# Patient Record
(demographics unavailable — no encounter records)

---
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
## 2025-04-21 NOTE — ASSESSMENT
[FreeTextEntry1] : Ms. OCAMPO 's questions were answered to her satisfaction.  She  expressed her  understanding and willingness to comply with the above recommendations, and  will return to the office in 6 months.

## 2025-04-21 NOTE — PHYSICAL EXAM
[Restricted in physically strenuous activity but ambulatory and able to carry out work of a light or sedentary nature] : Status 1- Restricted in physically strenuous activity but ambulatory and able to carry out work of a light or sedentary nature, e.g., light house work, office work [Normal] : normal appearance, no rash, nodules, vesicles, ulcers, erythema [de-identified] : bilateral breast lumpectomy; scars well-healed

## 2025-04-21 NOTE — REASON FOR VISIT
[Follow-Up Visit] : a follow-up [Family Member] : family member [FreeTextEntry2] : right breast invasive lobular carcinoma

## 2025-07-07 NOTE — ASSESSMENT
[FreeTextEntry1] : The patient is a 79 year old female with Right breast DCIS, low grade x 2 lesions, ER/LA positive, Left breast IDP and radial scars x 3 lesions, now s/p R breast lumpectomy and L excisional biopsy 5/20/2022.  5/20/2022 R lumpectomy and L excisional biopsy  - L RA and LOQ: IDP, radial scar  - L UIQ: IDP, radial scar lesions  - Additional L RA: IDP  - R lumpectomy: DCIS intermediate to high grade nuclear grade w/ necrosis, cribriform and micropapillary, intraduct papillomatosis w/ florid proliferative change, 21 mm, +EIC - R Superior margin, margin resection- invasive lobular carcinoma, pleomorphic type, multifocal (0.5 cm x 0.4 cm) 0.3 cm from inked resection margin cm.  - Other margins negative 6/28/2022 ODX: 11  Recent imaging reviewed, BR2  Exam today no masses or lymphadenopathy  Plan:  - F/u Medical oncology, continue raloxifene  - Next B/L SM and US 11/2025  - No more MRI at this time, given age and scattered densities in the breast  - RTO 1 year

## 2025-07-07 NOTE — CONSULT LETTER
[Dear  ___] : Dear  [unfilled], [Courtesy Letter:] : I had the pleasure of seeing your patient, [unfilled], in my office today. [Please see my note below.] : Please see my note below. [Consult Closing:] : Thank you very much for allowing me to participate in the care of this patient.  If you have any questions, please do not hesitate to contact me. [Sincerely,] : Sincerely, [FreeTextEntry3] : Monisha Aponte MD\par  Breast Surgeon\par  Division of Surgical Oncology\par  Department of Surgery\par  [address] [place] [address]\[place] [address] \par  Tel: [phone]\par  Fax: [phone]\par  Email: [email or web address]

## 2025-07-07 NOTE — PHYSICAL EXAM
[Normocephalic] : normocephalic [Atraumatic] : atraumatic [EOMI] : extra ocular movement intact [PERRL] : pupils equal, round and reactive to light [Sclera nonicteric] : sclera nonicteric [Supple] : supple [No Supraclavicular Adenopathy] : no supraclavicular adenopathy [No Cervical Adenopathy] : no cervical adenopathy [No Thyromegaly] : no thyromegaly [Examined in the supine and seated position] : examined in the supine and seated position [None] : no ptosis [No dominant masses] : no dominant masses in right breast  [No dominant masses] : no dominant masses left breast [No Nipple Retraction] : no left nipple retraction [No Nipple Discharge] : no left nipple discharge [Breast Mass Right Breast ___cm] : no masses [Breast Mass Left Breast ___cm] : no masses [Breast Nipple Inversion] : nipples not inverted [Breast Nipple Retraction] : nipples not retracted [Breast Nipple Flattening] : nipples not flattened [Breast Nipple Fissures] : nipples not fissured [No Axillary Lymphadenopathy] : no left axillary lymphadenopathy [No Edema] : no edema [No Rashes] : no rashes [No Ulceration] : no ulceration [de-identified] : non-labored respirations  [de-identified] : circumareolar incision healed well. Chest wall hyperpigmentation now resolved [de-identified] : circumareolar incision well healed

## 2025-07-07 NOTE — PHYSICAL EXAM
[Normocephalic] : normocephalic [Atraumatic] : atraumatic [EOMI] : extra ocular movement intact [PERRL] : pupils equal, round and reactive to light [Sclera nonicteric] : sclera nonicteric [Supple] : supple [No Supraclavicular Adenopathy] : no supraclavicular adenopathy [No Cervical Adenopathy] : no cervical adenopathy [No Thyromegaly] : no thyromegaly [Examined in the supine and seated position] : examined in the supine and seated position [None] : no ptosis [No dominant masses] : no dominant masses in right breast  [No dominant masses] : no dominant masses left breast [No Nipple Retraction] : no left nipple retraction [No Nipple Discharge] : no left nipple discharge [Breast Mass Right Breast ___cm] : no masses [Breast Mass Left Breast ___cm] : no masses [Breast Nipple Inversion] : nipples not inverted [Breast Nipple Retraction] : nipples not retracted [Breast Nipple Flattening] : nipples not flattened [Breast Nipple Fissures] : nipples not fissured [No Axillary Lymphadenopathy] : no left axillary lymphadenopathy [No Edema] : no edema [No Rashes] : no rashes [No Ulceration] : no ulceration [de-identified] : non-labored respirations  [de-identified] : circumareolar incision healed well. Chest wall hyperpigmentation now resolved [de-identified] : circumareolar incision well healed

## 2025-07-07 NOTE — REVIEW OF SYSTEMS
[Negative] : Heme/Lymph [Recent Weight Loss (___ Lbs)] : recent [unfilled] ~Ulb weight loss [FreeTextEntry2] : reports difficulty eating with dentures
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
## 2025-07-07 NOTE — CONSULT LETTER
[Dear  ___] : Dear  [unfilled], [Courtesy Letter:] : I had the pleasure of seeing your patient, [unfilled], in my office today. [Please see my note below.] : Please see my note below. [Consult Closing:] : Thank you very much for allowing me to participate in the care of this patient.  If you have any questions, please do not hesitate to contact me. [Sincerely,] : Sincerely, [FreeTextEntry3] : Monisha Aponte MD\par  Breast Surgeon\par  Division of Surgical Oncology\par  Department of Surgery\par  [address] [place] [address]\[place] [address] \par  Tel: [phone]\par  Fax: [phone]\par  Email: [email or web address]

## 2025-07-07 NOTE — REASON FOR VISIT
[Follow-Up: _____] : a [unfilled] follow-up visit [Family Member] : family member Bcc Histology Text: There were numerous aggregates of basaloid cells.

## 2025-07-07 NOTE — HISTORY OF PRESENT ILLNESS
Scheduled pt for Dr. Sanchez labs and also with dr. Sanchez first available which was Oct for DUANE Raymnudo on 5/22/2023 at 4:11 PM     [FreeTextEntry1] : The patient is a 79 year old female referred for consultation by Dr. 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  She is accompanied by her sister Yola today.  Prior history: The patient reports that she has had regular screening throughout her life, except in . She has never had any breast biopsies.  Her most recent imagin2021 B/L SM ([facility]) revealed heterogeneously dense breasts   - B/L grouped and non-grouped calcs, stable  - L medial asymmetric nodularity, stable  - R UOQ N2.5 possible asymmetry/distortion -> DM and US  - R upper and outer N5-6 4 mm nodule -> DM and US  - B/L benign-appearing LNs  - BR0  2022 R DM  - R UO subtle architectural distortion -> stereo bx   - R UO oval mass persists -> 6 month F/u  2022 B/L US   - R 12:00 N1 5 mm coarse calc, stable on  US  - R 6:00 N0 6 mm oval hypoechoic mass, probably benign -> 6 month f/u  - R 9:00 N2-3 9 mm lobulated hypoechoic mass, indeterminate -> Bx  - R 11:00 N4 3 mm complicated cyst, probably benign (c/w mmg mass) -> 6 month f/u  - L 12:00 N0 7 mm cyst  - L 10:00 N2 6 mm oval hypoechoic mass, probably benign -> 6 month f/u  - L 11:00 N2 5 mm cyst, probably benign -> 6 month f/u  - L 11:00 N2 3 m complicated cyst, probably benign -> 6 month f/u  - Negative LNs  - BR4  2022 BX  - R 10:00 N2 (S clip): DCIS low grade with radial sclerosing papillary lesion and FA  - R upper quadrant stereo bx (bone): DCIS, low grade  - ER 95% strong, AZ 95% strong  2022 Today, the patient reports no breast complaints. Denies pain, masses, skin changes, or nipple discharge.   She has history of HTN, DM, and HLD on metformin, losartan, simvastatin, and alendronate. She has had bilateral leg surgery following a fall in the snow , and abdominal adhesiolysis for congenital bands .  They report a significant family history of cancer: breast cancer in a paternal aunt age 40, later also with ovarian cancer. A maternal GM with gastric cancer. Mother, father and maternal uncle had colon cancer. Maternal uncle with renal carcinoma. Paternal uncle with prostate cancer and lymphoma (2 uncles).  2022 Genetic testing BRCA2, variant of uncertain significance ([facility])  3/7/2022 B/L MRI ([facility]) -R There is segmental non mass enhancement in the upper outer right breast at middle to posterior depth extending approx. 2cm above bx clip 1cm anterior ro bx clip and 1.8 posterior to clip. Together area measures approx. 2.8 (AP) x 0.9 cm (TV) x 2.4 (CC) cm, (Series 502, image 108/200 and series 512, image 92/120) demonstrates mixed plateau/washout kinetics. Correlate w/ DCIS. -R there is a clumped non mass enhancement in MARY/RA breast measuring 1.2 cm (AP) demonstrating mixed plateau/washout kinetics. This enhancement is detected w/in N3. On the same images, there is 7mm linear non mass enhancement in the posterior breast, demonstrating washout kinetics. -L 2 adjacent enhancing mass in UIQ and middle/posterior depth measuring 0.8 (AP) x 0.7 (TV) x 0.4cm (CC) demonstrating washout kinetics (Series 502, image 103/200 and series 512, image 28/120)->MR guided bx -L there is a clumped linear non mass enhancement of the nipple areolar complex and posterior to the nipple in the lateral and central RA region measuring approx. 1.3 cm (AP) demonstrating mixed plateau/washout kinetics. (Series 502, image 122/200 and series 512, image 29/120)-> MRI guided bx -L there is an enhancing mass in the lower slightly outer left breast at middle depth measuring 0.5 (AP)x0.4(TV)x0.3 cm (CC) demonstrating mixed plateau/washout kinetics (Series 502, image 130/200 and series 512, image 23/120)-> MRI guided bx - R>L prominent axillary LN ->f/u targeted US and possible bx -There are subcm T2 hyperintensities in the liver which may represent cysts vs hemangioma -BR4, rec L breast UIQ and L RA-> MRI guided bx in 3 areas. Recommend R breast non mass  enhancement lower outer RA and LOP, if breast conservation is being considered, MRI guided bx of these two areas of non mass enhancement as well as posterior extent of non mass enhancement, extending posterior to the clip.  3/31/2022 B/L axillary, unremarkable, BR2  3/31/2022 R MRI bx (cork clip) -R anterior retro x 4 cores, proliferative and nonproliferative fibrocystic change w/ dense stromal fibrosis, focal florid usual duct hyperplasia, apocrine metaplasia and microcyst formation. Skin with no significant change. Microcalcifications present in fibrocystic changes -R LOP x 6 cores, proliferative and nonproliferative fibrocystic change w/ dense stromal fibrosis, focal florid usual duct hyperplasia, apocrine metaplasia and microcyst formation. Microcalcifications present in fibrocystic changes -Results benign and concordant  3/31/2022 L MRI bx (hourglass, slightly inner and stoplight clip, central) -L upper inner - IDP w/ florid duct hyperplasia -L lower outer- radial scar w/ florid duct hyperplasia and calcifications - Consider MR guided CNB of clumped linear NME enhancement in the nipple areola complex posterior to nipple reported on outside MRI 3/7/2022 -Results high risk and concordant -> surgical/oncologic  2022 B/L Magseed -R UOQ bone/S clip marking DCIS and radial scar. Magseed in between Bone and S clip. -L UIQ hourglass clip marking IDP. Magseed 6mm posterior to clip. -L LOQ stoplight clip marking radial scar, magseed in expected location. -L RA buckle clip marking IDP, magseed in expected location. - R DCIS 2.8 0.9 x 2.4 cm segment of NME surrounding bx marker. As such, a wide excision is recommended. -L UIQ located medial posterior to the hourglass clip. As such, a wide excision is recommended anteriorly.   2022 R lumpectomy, Left excisional biopsy  1) L RA and LOQ: IDP, radial scar  2) L UIQ: IDP, radial scar lesions  3) Additional L RA: IDP   4) R lumpectomy: DCIS intermediate to high grade nuclear grade w/ necrosis, cribriform and micropapillary, intraduct papillomatosis w/ florid proliferative change, 21 mm, +EIC  5) R Superior margin, margin resection- invasive lobular carcinoma, pleomorphic type, multifocal (0.5 cm x 0.4 cm) 0.3 cm from inked resection margin cm.  6) Other margins negative  7) pT1aNx, ER 90%, AZ 90%, HER2 0  2022 (Postop): The patient reports that she had very minimal pain after surgery. She took Tylenol briefly. Is doing well. Denies fevers/chills.  2022 ODX: 11  2022: Patient completed radiation therapy on 2022. Planned to start SERM, but will wait to start until f/u with Dr Dunham 10/10/22. No breast complaints at this time.  2022 B/L DM ([facility]) - B/L central and UOQ postsurgical changes and loosely scattered punctate benign type calcs  2022 B/L US - R 6:00 RA: 6 mm oval hypoechoic mass previously seen is not visualized. - R 9:00 RA new postsurgical changes at reported scar site - R 11:00 N4: 3 x 3 x 3 mm complicated cyst w/o change -> f/u R US in 2 months - R 12:00 RA new postsurgical change at reported scar site - R RA: 3 mm mild skin thickening at the scar site - L 1:00 N3: 8 x 8 x 4 mm simple cyst w/o significant change (felt to reflect 7 mm cyst previously 12:00 N0). - L 10:00 N2: 6 mm oval hypoechoic mass previously seen is not visualized  - L 12:00 N2: 3 mm complicated cyst is stable (previously 11:00 N2) -> f/u L US in 2 months - L 11:00 N2: 5 mm complicated cyst previously seen is not visualized - L 10:00 RA: 3 mm new postsurgical changes and skin thickening at the scar site - BR3  2023 B/L US ([facility]) - R 11:00 N4: 0.3 x 0.3 x 0.2 cm benign-appearing cyst w/o significant change in size since a prior study dated 2022. - L 12:00 N2: 0.3 x 0.3 x 0.2 cm benign-appearing cyst smaller than on 2022 where it measured 0.7 x 0.2 x 0.7 cm. - BR2  3/13/2023- Patient started on Raloxifene on 10/10/2022, tolerating well. No breast complaints today.  2023 Breast MRI -R new lumpectomy changes -R nipple asymmetric enhancement without associated mass--> correlate clinically -R subareolar bx marker -L postop changes -L overall decreased foci of enhancement c/w interval surgery -L central multiple residual subcentimeter nodules -BR3  2023 B/L DM ([facility]) -b/l stable postop appearance -BR2  2023 B/L US ([facility])  -R 9:00 RA post op scarring -L 10:00 RA post op scarring -L 1:00 8 mm cyst -L 12:00 N2 previous finding no longer conspicuous  -BR2  2023 Breast MRI -b/l stable post op changes w/o suspicious enhancement -R nipple asymmetric nonspecific enhancement is less conspicuous--> f/u MRI in 6 months -BR3  2024:  Taking raloxifene-- arthritis, but tolerating symptoms. Does not want to switch to AI due to osteoporosis. Denies any breast complaints today--no masses, skin changes, nipple discharge.  6/3/2024 B/L MRI ([facility])  - R postop change - R retroareolar biopsy clip susceptibility artifact  - R nipple asymmetric enhancement without significant change.  - R scattered enhancing nonspecific foci  - L postop change - L scattered stable enhancing nonspecific foci. - BR 2   2024:  Patient denies any breast complaints today. Denies any breast masses, skin changes, or nipple discharge. Taking raloxifene-tolerating well. Arthritis is still an issue but pt is dealing with it. Getting physical therapy.  2024 B/L DM ([facility]) revealed SFGD -B/L postop changes, stable -R RA bx clip, stable -BR2  2024 B/L US ([facility]) -R 9:00 RA postop scarring -L 10:00 RA postop scarring -L 1:00 N3 8 mm cyst -BR2  2025 Breast MRI ([facility]) -R postop changes stable -L postop changes, stable -BR2  Interval history:  Patient denies any breast complaints today. Denies any breast masses, skin changes, or nipple discharge. Taking raloxifene-no side effects.

## 2025-07-07 NOTE — ASSESSMENT
[FreeTextEntry1] : The patient is a 79 year old female with Right breast DCIS, low grade x 2 lesions, ER/DC positive, Left breast IDP and radial scars x 3 lesions, now s/p R breast lumpectomy and L excisional biopsy 5/20/2022.  5/20/2022 R lumpectomy and L excisional biopsy  - L RA and LOQ: IDP, radial scar  - L UIQ: IDP, radial scar lesions  - Additional L RA: IDP  - R lumpectomy: DCIS intermediate to high grade nuclear grade w/ necrosis, cribriform and micropapillary, intraduct papillomatosis w/ florid proliferative change, 21 mm, +EIC - R Superior margin, margin resection- invasive lobular carcinoma, pleomorphic type, multifocal (0.5 cm x 0.4 cm) 0.3 cm from inked resection margin cm.  - Other margins negative 6/28/2022 ODX: 11  Recent imaging reviewed, BR2  Exam today no masses or lymphadenopathy  Plan:  - F/u Medical oncology, continue raloxifene  - Next B/L SM and US 11/2025  - No more MRI at this time, given age and scattered densities in the breast  - RTO 1 year